# Patient Record
Sex: MALE | Race: OTHER | Employment: UNEMPLOYED | URBAN - METROPOLITAN AREA
[De-identification: names, ages, dates, MRNs, and addresses within clinical notes are randomized per-mention and may not be internally consistent; named-entity substitution may affect disease eponyms.]

---

## 2021-02-18 ENCOUNTER — OFFICE VISIT (OUTPATIENT)
Dept: INTERNAL MEDICINE CLINIC | Age: 31
End: 2021-02-18
Payer: COMMERCIAL

## 2021-02-18 VITALS
SYSTOLIC BLOOD PRESSURE: 124 MMHG | DIASTOLIC BLOOD PRESSURE: 78 MMHG | BODY MASS INDEX: 22.31 KG/M2 | OXYGEN SATURATION: 99 % | HEART RATE: 70 BPM | WEIGHT: 147.2 LBS | TEMPERATURE: 97.2 F | HEIGHT: 68 IN

## 2021-02-18 DIAGNOSIS — E10.65 TYPE 1 DIABETES MELLITUS WITH HYPERGLYCEMIA (HCC): Primary | ICD-10-CM

## 2021-02-18 DIAGNOSIS — E78.2 MIXED HYPERLIPIDEMIA: ICD-10-CM

## 2021-02-18 DIAGNOSIS — Z01.89 ROUTINE LAB DRAW: ICD-10-CM

## 2021-02-18 LAB
A/G RATIO: 1.7 (ref 1.1–2.2)
ALBUMIN SERPL-MCNC: 4.8 G/DL (ref 3.4–5)
ALP BLD-CCNC: 64 U/L (ref 40–129)
ALT SERPL-CCNC: 16 U/L (ref 10–40)
ANION GAP SERPL CALCULATED.3IONS-SCNC: 10 MMOL/L (ref 3–16)
AST SERPL-CCNC: 23 U/L (ref 15–37)
BASOPHILS ABSOLUTE: 0 K/UL (ref 0–0.2)
BASOPHILS RELATIVE PERCENT: 0.7 %
BILIRUB SERPL-MCNC: 0.8 MG/DL (ref 0–1)
BUN BLDV-MCNC: 15 MG/DL (ref 7–20)
CALCIUM SERPL-MCNC: 9.7 MG/DL (ref 8.3–10.6)
CHLORIDE BLD-SCNC: 95 MMOL/L (ref 99–110)
CHOLESTEROL, TOTAL: 191 MG/DL (ref 0–199)
CHP ED QC CHECK: NORMAL
CO2: 28 MMOL/L (ref 21–32)
CREAT SERPL-MCNC: 0.7 MG/DL (ref 0.9–1.3)
CREATININE URINE: 58.4 MG/DL (ref 39–259)
EOSINOPHILS ABSOLUTE: 0.2 K/UL (ref 0–0.6)
EOSINOPHILS RELATIVE PERCENT: 2.6 %
GFR AFRICAN AMERICAN: >60
GFR NON-AFRICAN AMERICAN: >60
GLOBULIN: 2.8 G/DL
GLUCOSE BLD-MCNC: 237 MG/DL
GLUCOSE BLD-MCNC: 278 MG/DL (ref 70–99)
HCT VFR BLD CALC: 49.5 % (ref 40.5–52.5)
HDLC SERPL-MCNC: 44 MG/DL (ref 40–60)
HEMOGLOBIN: 16.7 G/DL (ref 13.5–17.5)
LDL CHOLESTEROL CALCULATED: 119 MG/DL
LYMPHOCYTES ABSOLUTE: 2.8 K/UL (ref 1–5.1)
LYMPHOCYTES RELATIVE PERCENT: 38.6 %
MCH RBC QN AUTO: 28.6 PG (ref 26–34)
MCHC RBC AUTO-ENTMCNC: 33.7 G/DL (ref 31–36)
MCV RBC AUTO: 84.7 FL (ref 80–100)
MICROALBUMIN UR-MCNC: 18.1 MG/DL
MICROALBUMIN/CREAT UR-RTO: 309.9 MG/G (ref 0–30)
MONOCYTES ABSOLUTE: 0.4 K/UL (ref 0–1.3)
MONOCYTES RELATIVE PERCENT: 5.1 %
NEUTROPHILS ABSOLUTE: 3.8 K/UL (ref 1.7–7.7)
NEUTROPHILS RELATIVE PERCENT: 53 %
PDW BLD-RTO: 12 % (ref 12.4–15.4)
PLATELET # BLD: 182 K/UL (ref 135–450)
PMV BLD AUTO: 9.2 FL (ref 5–10.5)
POTASSIUM SERPL-SCNC: 4.2 MMOL/L (ref 3.5–5.1)
RBC # BLD: 5.84 M/UL (ref 4.2–5.9)
SODIUM BLD-SCNC: 133 MMOL/L (ref 136–145)
TOTAL PROTEIN: 7.6 G/DL (ref 6.4–8.2)
TRIGL SERPL-MCNC: 139 MG/DL (ref 0–150)
VLDLC SERPL CALC-MCNC: 28 MG/DL
WBC # BLD: 7.2 K/UL (ref 4–11)

## 2021-02-18 PROCEDURE — 99203 OFFICE O/P NEW LOW 30 MIN: CPT | Performed by: INTERNAL MEDICINE

## 2021-02-18 PROCEDURE — G8420 CALC BMI NORM PARAMETERS: HCPCS | Performed by: INTERNAL MEDICINE

## 2021-02-18 PROCEDURE — 3046F HEMOGLOBIN A1C LEVEL >9.0%: CPT | Performed by: INTERNAL MEDICINE

## 2021-02-18 PROCEDURE — G8484 FLU IMMUNIZE NO ADMIN: HCPCS | Performed by: INTERNAL MEDICINE

## 2021-02-18 PROCEDURE — G8427 DOCREV CUR MEDS BY ELIG CLIN: HCPCS | Performed by: INTERNAL MEDICINE

## 2021-02-18 PROCEDURE — 82962 GLUCOSE BLOOD TEST: CPT | Performed by: INTERNAL MEDICINE

## 2021-02-18 PROCEDURE — 36415 COLL VENOUS BLD VENIPUNCTURE: CPT | Performed by: INTERNAL MEDICINE

## 2021-02-18 PROCEDURE — 1036F TOBACCO NON-USER: CPT | Performed by: INTERNAL MEDICINE

## 2021-02-18 PROCEDURE — 2022F DILAT RTA XM EVC RTNOPTHY: CPT | Performed by: INTERNAL MEDICINE

## 2021-02-18 RX ORDER — INSULIN ASPART 100 [IU]/ML
0-10 INJECTION, SOLUTION INTRAVENOUS; SUBCUTANEOUS
Qty: 3 PEN | Refills: 3 | Status: SHIPPED | OUTPATIENT
Start: 2021-02-18

## 2021-02-18 RX ORDER — INSULIN GLARGINE 100 [IU]/ML
20 INJECTION, SOLUTION SUBCUTANEOUS NIGHTLY
Qty: 3 PEN | Refills: 3 | Status: SHIPPED | OUTPATIENT
Start: 2021-02-18

## 2021-02-18 RX ORDER — ATORVASTATIN CALCIUM 20 MG/1
20 TABLET, FILM COATED ORAL DAILY
Qty: 30 TABLET | Refills: 3 | Status: SHIPPED | OUTPATIENT
Start: 2021-02-18

## 2021-02-18 SDOH — ECONOMIC STABILITY: FOOD INSECURITY: WITHIN THE PAST 12 MONTHS, YOU WORRIED THAT YOUR FOOD WOULD RUN OUT BEFORE YOU GOT MONEY TO BUY MORE.: NEVER TRUE

## 2021-02-18 SDOH — ECONOMIC STABILITY: TRANSPORTATION INSECURITY
IN THE PAST 12 MONTHS, HAS THE LACK OF TRANSPORTATION KEPT YOU FROM MEDICAL APPOINTMENTS OR FROM GETTING MEDICATIONS?: NOT ASKED

## 2021-02-18 ASSESSMENT — PATIENT HEALTH QUESTIONNAIRE - PHQ9
SUM OF ALL RESPONSES TO PHQ QUESTIONS 1-9: 0
1. LITTLE INTEREST OR PLEASURE IN DOING THINGS: 0

## 2021-02-18 NOTE — PROGRESS NOTES
Name: Lata Gallegos  [id number]  Age: 26 y.o. YOB: 1990  Sex: male    CHIEF COMPLAINT:    Chief Complaint   Patient presents with    New Patient    Diabetes     patient just [place] inject from [place] not sure what it is. HISTORY OF PRESENT ILLNESS:     This is a pleasant  26 y.o. male  is seen today to establish care and for management of chronic medical problems and medications refills. Previous records reviewed . Moved from [facility] 6 years ago. He visits his country frequently. He was diagnosed with diabetes type I 2 years ago. Was on insulin before but ran out of it @ 2-3 months ago. Had blood work done in November when he went there and was told his  Glucose was in 250+ and aic was 8.9. Also his LDL was 131. Doing OK . Denies CP or SOB. No fever , sore throat or cough or congestion. Denies any abdominal pain. Appetite OK. Bowels moving [id number] NO INCREASED URINATION, NO INCREASED THIRST, NO WEIGHT LOSS OR BLURRED VISION. Denies any significant arthritis. Hearing is ok. Vision Ok with glasses. Denies  any significant skin lesions. Denies any significant depression or anxiety. No other complaints. Past Medical History: There is no problem list on file for this patient. Past Surgical History:    History reviewed. No pertinent surgical history. Social History:   Social History     Tobacco Use    Smoking status: Never Smoker    Smokeless tobacco: Never Used   Substance Use Topics    Alcohol use: Never     Frequency: Never       Family History:       Problem Relation Age of Onset    High Blood Pressure Father        Allergies:  Patient has no known allergies. Current Medications :      Prior to Admission medications    Medication Sig Start Date End Date Taking?  Authorizing Provider   insulin glargine (LANTUS SOLOSTAR) 100 UNIT/ML injection pen Inject 20 Units into the skin nightly 2/18/21  Yes Jami Bhardwaj MD   insulin aspart (Tarry Clear FLEXPEN) 100 UNIT/ML injection pen Inject 0-10 Units into the skin 3 times daily (before meals) Less than 150. .. 0 units. 151-200. ............ 2 units  201-250. ............ 4 units  251-300. ........... 6 units. 301-350. .......... Windy Ravi 8 units  351- 400             10 units. More than 400    12 units and call 2/18/21  Yes Orion Lazcano MD   atorvastatin (LIPITOR) 20 MG tablet Take 1 tablet by mouth daily 2/18/21  Yes Orion Lazcano MD       LAB DATA: Reviewed. REVIEW OF SYSTEMS:   see HPI/ Comprehensive review of systems negative except for the ones mentioned in HPI. PHYSICAL EXAMINATION:   /78 (Site: Right Upper Arm, Position: Sitting, Cuff Size: Medium Adult)   Pulse 70   Temp 97.2 °F (36.2 °C)   Ht 5' 8\" (1.727 m)   Wt 147 lb 3.2 oz (66.8 kg)   SpO2 99%   BMI 22.38 kg/m²      GENERAL APPEARANCE:    Alert, oriented x 3, well developed, cooperative, not in any distress, appears stated age. HEAD:   Normocephalic, atraumatic   EYES:   PERRLA, EOMI, lids normal, conjuctivea clear, sclera anicteric. NECK:    Supple, symmetrical,  trachea midline, no thyromegaly, no JVD, no lymphadenopathy. LUNGS:    Clear to auscultation bilaterally, respirations unlabored, accessory muscles are not used. HEART:     Regular rate and rhythm, S1 and S2 normal, no murmur, rub or gallop. PMI in MCL. ABDOMEN:    Soft, non-tender, bowel sounds are normoactive, no masses, no hepatospleenomegaly. EXTREMITY:   no bipedal edema  NEURO:  Alert, oriented to person, place and time. Grossly intact. Musculoskeletal:         No kyphosis or scoliosis, no deformity in any extremity noted, muscle strength and tone are normal.  Skin:                            Warm and dry. No rash or obvious suspicious lesions. PSYCH:  Mood euthymic, insight and judgement good. ASSESSMENT/PLAN:    1. Type 1 diabetes mellitus with hyperglycemia (HCC)  Advised diet, exercise. Advised to be compliant with medications.   Start him on Lantus and NovoLog subcu as needed. Refer him to endocrinologist.  Home glucose monitor with supplies ordered. - POCT Glucose  - Hemoglobin A1C  - Microalbumin / Creatinine Urine Ratio  - insulin glargine (LANTUS SOLOSTAR) 100 UNIT/ML injection pen; Inject 20 Units into the skin nightly  Dispense: 3 pen; Refill: 3  - insulin aspart (NOVOLOG FLEXPEN) 100 UNIT/ML injection pen; Inject 0-10 Units into the skin 3 times daily (before meals) Less than 150. .. 0 units. 151-200. ............ 2 units  201-250. ............ 4 units  251-300. ........... 6 units. 301-350. .......... Lesle Viing 8 units  351- 400             10 units. More than 400    12 units and call  Dispense: 3 pen; Refill: 3  - Amb External Referral To Endocrinology    2. Mixed hyperlipidemia  Start him on Lipitor 20 mg daily. Advised low-cholesterol diet. - Lipid Panel; Future  - atorvastatin (LIPITOR) 20 MG tablet; Take 1 tablet by mouth daily  Dispense: 30 tablet; Refill: 3    3. Routine lab draw  - Comprehensive Metabolic Panel  - CBC Auto Differential    I have recommended that the patient follow CDC guidelines for prevention of COVID-19 infection. I also discussed Coronavirus precaution including wearing face mask, handwashing practice, wiping items touched in public such as gas pumps, door handles, shopping carts, etc. Also Self monitoring for infection - fever, chills, cough, SOB. Should he/she develop symptoms he/she should call office or go to ER for further instructions. Care discussed with patient. Questions answered and patient verbalizes understanding and agrees with plan. Medications reviewed and reconciled. Continue current medications. Appropriate prescriptions are ordered. Risks and benefits of meds are discussed. After visit summary provided. Advised to call for any problems, questions, or concerns. If symptoms worsen or don't improve as expected, to call us or go to ER. Follow up as directed, sooner if needed.       Return in about 2 months (around 4/18/2021). This dictation was performed with a verbal recognition program and it was checked for errors. It is possible that there are still dictated errors within this office note. Any errors should be brought immediately to my attention for correction. All efforts were made to ensure that this office note is accurate.      Honorio Walden MD

## 2021-02-19 LAB
ESTIMATED AVERAGE GLUCOSE: 240.3 MG/DL
HBA1C MFR BLD: 10 %

## 2021-03-25 ENCOUNTER — TELEPHONE (OUTPATIENT)
Dept: INTERNAL MEDICINE CLINIC | Age: 31
End: 2021-03-25

## 2021-03-25 RX ORDER — BLOOD-GLUCOSE TRANSMITTER
EACH MISCELLANEOUS
COMMUNITY
End: 2021-03-25 | Stop reason: SDUPTHER

## 2021-03-25 RX ORDER — BLOOD-GLUCOSE SENSOR
EACH MISCELLANEOUS
COMMUNITY
End: 2021-03-25 | Stop reason: SDUPTHER

## 2021-03-25 RX ORDER — BLOOD-GLUCOSE,RECEIVER,CONT
EACH MISCELLANEOUS
COMMUNITY
End: 2021-03-25 | Stop reason: SDUPTHER

## 2021-03-26 RX ORDER — BLOOD-GLUCOSE SENSOR
1 EACH MISCELLANEOUS DAILY
Qty: 1 EACH | Refills: 5 | Status: SHIPPED | OUTPATIENT
Start: 2021-03-26

## 2021-03-26 RX ORDER — BLOOD-GLUCOSE,RECEIVER,CONT
1 EACH MISCELLANEOUS DAILY
Qty: 1 DEVICE | Refills: 0 | Status: SHIPPED | OUTPATIENT
Start: 2021-03-26

## 2021-03-26 RX ORDER — BLOOD-GLUCOSE TRANSMITTER
1 EACH MISCELLANEOUS DAILY
Qty: 1 EACH | Refills: 5 | Status: SHIPPED | OUTPATIENT
Start: 2021-03-26